# Patient Record
Sex: FEMALE | Race: WHITE | NOT HISPANIC OR LATINO | Employment: FULL TIME | URBAN - NONMETROPOLITAN AREA
[De-identification: names, ages, dates, MRNs, and addresses within clinical notes are randomized per-mention and may not be internally consistent; named-entity substitution may affect disease eponyms.]

---

## 2021-04-19 ENCOUNTER — OFFICE VISIT (OUTPATIENT)
Dept: PSYCHIATRY | Facility: CLINIC | Age: 51
End: 2021-04-19

## 2021-04-19 VITALS
HEART RATE: 65 BPM | OXYGEN SATURATION: 97 % | HEIGHT: 66 IN | DIASTOLIC BLOOD PRESSURE: 88 MMHG | SYSTOLIC BLOOD PRESSURE: 132 MMHG | TEMPERATURE: 98.1 F | BODY MASS INDEX: 35.52 KG/M2 | WEIGHT: 221 LBS

## 2021-04-19 DIAGNOSIS — G47.00 INSOMNIA, UNSPECIFIED TYPE: ICD-10-CM

## 2021-04-19 DIAGNOSIS — F41.1 GENERALIZED ANXIETY DISORDER: Primary | ICD-10-CM

## 2021-04-19 DIAGNOSIS — Z79.899 MEDICATION MANAGEMENT: ICD-10-CM

## 2021-04-19 PROCEDURE — 90792 PSYCH DIAG EVAL W/MED SRVCS: CPT | Performed by: NURSE PRACTITIONER

## 2021-04-19 RX ORDER — TRAZODONE HYDROCHLORIDE 50 MG/1
TABLET ORAL
Qty: 30 TABLET | Refills: 0 | Status: SHIPPED | OUTPATIENT
Start: 2021-04-19

## 2021-04-19 RX ORDER — ESTROGENS, CONJUGATED 0.9 MG
TABLET ORAL
COMMUNITY
Start: 2021-04-16

## 2021-04-19 RX ORDER — LEVOTHYROXINE SODIUM 0.15 MG/1
TABLET ORAL
COMMUNITY
Start: 2021-03-29

## 2021-04-19 RX ORDER — DICLOFENAC SODIUM AND MISOPROSTOL 75; 200 MG/1; UG/1
1 TABLET, DELAYED RELEASE ORAL 2 TIMES DAILY
COMMUNITY

## 2021-04-19 RX ORDER — ONDANSETRON 4 MG/1
TABLET, FILM COATED ORAL
COMMUNITY
Start: 2021-03-09

## 2021-04-19 RX ORDER — DIAZEPAM 5 MG/1
TABLET ORAL
COMMUNITY
Start: 2021-03-09 | End: 2021-04-19

## 2021-04-19 RX ORDER — TOLTERODINE 4 MG/1
CAPSULE, EXTENDED RELEASE ORAL
COMMUNITY
Start: 2021-03-29

## 2021-04-19 RX ORDER — BUSPIRONE HYDROCHLORIDE 5 MG/1
5 TABLET ORAL 2 TIMES DAILY
COMMUNITY

## 2021-04-19 RX ORDER — FLUOXETINE HYDROCHLORIDE 20 MG/1
CAPSULE ORAL
COMMUNITY
Start: 2021-03-29

## 2021-04-19 NOTE — PROGRESS NOTES
"Subjective   Angie Antonio is a 51 y.o. female who presents today for initial evaluation     Chief Complaint:  Anxiety    History of Present Illness:   Here today for initial evaluation.  She is referred here by weight loss clinic for review of her medications.  She states she is on Buspar and Prozac, she received a prescription for Valium, but states she does not take it, she cannot rest when she takes it.   Born and raised in [place], [place] with both parents, (which both were principals.  She has one younger brother. She describes her childhood as \"great\", she got along well with her parents and brother. Her mother passed away in 2013, from sepsis.  She states she is very close with her father and brother, she live within 15 minutes of one another. Denies any prior trauma or abuse in the past.  She has had a prior marriage x 15 years, with 3 children, she states she can co  Parent with her exhusband without problems. She lost fiance in 2019 from a mining accident. They had been engaged for six years , her 3 children are doing well. She states she stays busy, \"they keep me young\".  She states she first started having anxiety symptoms at age 19, denies prior hospitalizations for anxiety or depression.  She states she has taken anxiety medications except while she was pregnant.  She has been on prozac, buspar, wellbutrin, and lexapro. She states she has done the best on Prozac and Buspar.  She is employed as a  for 29 years, states she is ready for snf.  Sleeping is poor, she has problems staying asleep and going to sleep, she is getting about 3 to 4 hours a night, she awakens through the night, she states she is a very light sleeper, she can hear the ice falls into the tray.  She does have nightmares, once a week, or so, she wakes up and if she is scared she gets up and goes to sleep. She states she watches scary movies, which can make the nightmares worse. She has more when she is stressed " or worried about something.  Appetite is good, she states she has a weight issue for most of her life. She states she has tried multiple ways, and she cannot lose weight, she states, she has given up caffeine, and ernie. She is possible going to have the gastric sleeve surgery, she is in the final approval stage. The patient reports the following symptoms of anxiety: sleep disturbance and have caused impairment in important areas of functioning. Anxiety rated 2/10 with 10 being the worst. She denies any significant depression symptoms. Discussed to increase long term success of weight loss surgery, it requires a life long commitment to lifestyle changes including diet and exercise.                   The following portions of the patient's history were reviewed and updated as appropriate: allergies, current medications, past family history, past medical history, past social history, past surgical history and problem list.      Past Medical History:  Past Medical History:   Diagnosis Date   • Anxiety        Social History:  Social History     Socioeconomic History   • Marital status:      Spouse name: Not on file   • Number of children: Not on file   • Years of education: Not on file   • Highest education level: Not on file   Tobacco Use   • Smoking status: Never Smoker   • Smokeless tobacco: Never Used   Vaping Use   • Vaping Use: Never used       Family History:  History reviewed. No pertinent family history.    Past Surgical History:  Past Surgical History:   Procedure Laterality Date   • BREAST SURGERY     • CHOLECYSTECTOMY     • HYSTERECTOMY         Problem List:  There is no problem list on file for this patient.      Allergy:   Allergies   Allergen Reactions   • Famvir [Famciclovir] Rash        Current Medications:   Current Outpatient Medications   Medication Sig Dispense Refill   • busPIRone (BUSPAR) 5 MG tablet Take 5 mg by mouth 2 (Two) Times a Day.     • diclofenac-miSOPROStol (ARTHROTEC 75) 75-0.2  "MG EC tablet Take 1 tablet by mouth 2 (Two) Times a Day.     • FLUoxetine (PROzac) 20 MG capsule      • levothyroxine (SYNTHROID, LEVOTHROID) 150 MCG tablet      • metoprolol tartrate (LOPRESSOR) 25 MG tablet Take 25 mg by mouth 2 (Two) Times a Day.     • ondansetron (ZOFRAN) 4 MG tablet      • Premarin 0.9 MG tablet      • tolterodine LA (DETROL LA) 4 MG 24 hr capsule      • traZODone (DESYREL) 50 MG tablet Take one half tablet at bedtime, if after 5 days if insomnia persists, may take 1 whole tablet at bedtime for insomnia 30 tablet 0     No current facility-administered medications for this visit.       Review of Symptoms:    Review of Systems   Constitutional: Negative.    HENT: Negative.    Eyes: Negative.    Respiratory: Negative.    Cardiovascular: Negative.    Neurological: Negative.    Psychiatric/Behavioral: Positive for sleep disturbance. The patient is nervous/anxious.        Objective   Physical Exam:   Blood pressure 132/88, pulse 65, temperature 98.1 °F (36.7 °C), height 167.6 cm (66\"), weight 100 kg (221 lb), SpO2 97 %.  Body mass index is 35.67 kg/m².    Appearance:  female appears stated age, no acute distress noted.    Gait, Station, Strength: Steady, posture erect, WNL      Mental Status Exam:   Hygiene:   good  Cooperation:  Cooperative  Eye Contact:  Good  Psychomotor Behavior:  Appropriate  Affect:  Appropriate  Mood: normal  Hopelessness: Denies  Speech:  Normal  Thought Process:  Goal directed and Linear  Thought Content:  Normal  Suicidal:  None  Homicidal:  None  Hallucinations:  None  Delusion:  None  Memory:  Intact  Orientation:  Person, Place, Time and Situation  Reliability:  good  Insight:  Good  Judgement:  Good  Impulse Control:  Good  Physical/Medical Issues:  No      PHQ-Score Total:  PHQ-9 Total Score: 3    Lab Results:   No visits with results within 1 Month(s) from this visit.   Latest known visit with results is:   No results found for any previous visit.       "       Assessment/Plan   Problems Addressed this Visit     None      Visit Diagnoses     Generalized anxiety disorder    -  Primary    Relevant Medications    FLUoxetine (PROzac) 20 MG capsule    busPIRone (BUSPAR) 5 MG tablet    traZODone (DESYREL) 50 MG tablet    Insomnia, unspecified type        Relevant Medications    traZODone (DESYREL) 50 MG tablet    Medication management        Relevant Medications    traZODone (DESYREL) 50 MG tablet      Diagnoses       Codes Comments    Generalized anxiety disorder    -  Primary ICD-10-CM: F41.1  ICD-9-CM: 300.02     Insomnia, unspecified type     ICD-10-CM: G47.00  ICD-9-CM: 780.52     Medication management     ICD-10-CM: Z79.899  ICD-9-CM: V58.69         Social History     Tobacco Use   Smoking Status Never Smoker   Smokeless Tobacco Never Used     JACQUELINE reviewed and appropriate. Patient counseled on use of controlled substances.       -The benefits of a healthy diet and exercise were discussed with patient, especially the positive effects they have on mental health. Patient encouraged to consider lifestyle modification regarding  diet and exercise patterns to maximize results of mental health treatment.  -Reviewed previous available documentation  -Reviewed most recent available labs   Discussed medication options and treatment plan of prescribed medication, any off label use of medication, as well as the risks, benefits, any black box warnings including increased suicidality, and side effects including but not limited to potential falls, dizziness, possible impaired driving, GI side effects (change in appetite, abdominal discomfort, nausea, vomiting, diarrhea, and/or constipation), dry mouth, somnolence, sedation, insomnia, activation, agitation, irritation, tremors, abnormal muscle movements or disorders, headache, sweating, possible bruising or rare bleeding, electrolyte and/or fluid abnormalities, change in blood pressure/heart rate/and or heart rhythm, sexual  dysfunction, and metabolic adversities among others. Patient and/or guardian agreeable to call the office with any worsening of symptoms or onset of side effects, or if any concerns or questions arise.  The contact information for the office is made available to the patient and/or guardian.  Patient and/or guardian agreeable to call 911 or go to the nearest ER should they begin having any SI/HI, or if any urgent concerns arise. No medication side effects or related complaints today.  We discussed sleep hygiene including going to bed at the same time and getting up at the same time every day, decreased caffeine consumption, going to bed early enough to get 7 or 8 hours in bed, reading and relaxing before bedtime, and avoiding the TV, computer, phone, iPad close to bedtime.        Visit Diagnoses:    ICD-10-CM ICD-9-CM   1. Generalized anxiety disorder  F41.1 300.02   2. Insomnia, unspecified type  G47.00 780.52   3. Medication management  Z79.899 V58.69         TREATMENT PLAN/GOALS: Continue supportive psychotherapy efforts and medications as indicated. Treatment and medication options discussed during today's visit. Patient acknowledged and verbally consented to continue with current treatment plan and was educated on the importance of compliance with treatment and follow-up appointments.    MEDICATION ISSUES:  Discussed medication options and treatment plan of prescribed medication as well as the risks, benefits, and side effects including potential falls, possible impaired driving and metabolic adversities among others. Patient is agreeable to call the office with any worsening of symptoms or onset of side effects. Patient is agreeable to call 911 or go to the nearest ER should he/she begin having SI/HI.     MEDS ORDERED DURING VISIT:  New Medications Ordered This Visit   Medications   • traZODone (DESYREL) 50 MG tablet     Sig: Take one half tablet at bedtime, if after 5 days if insomnia persists, may take 1  whole tablet at bedtime for insomnia     Dispense:  30 tablet     Refill:  0       Return in about 1 month (around 5/19/2021) for Recheck.  -She is currently stable on her current anxiety medications, and the only recommendation, is to add Trazodone 50 mg tablet at night, take one half to 1 whole tablet at bedtime for insomnia and anxiety.  Her PCP may take over prescribing Trazodone if they are comfortable, and continue Prozac and Buspar as currently prescribed.        Prognosis: Guarded dependent on medication/follow up and treatment plan compliance.  Functionality: pt showing improvements in important areas of daily functioning.     Short-term goals: Patient will adhere to medication regimen and note continued improvement in symptoms over the next 3 months.   Long-term goals: Patient will be adherent to medication management and psychotherapy with continued improvement in symptoms over the next 6 months        This document has been electronically signed by DAMION Dias   April 19, 2021 16:28 EDT    Part of this note may be an electronic transcription/translation of spoken language to printed text using the Dragon Dictation System.